# Patient Record
Sex: MALE
[De-identification: names, ages, dates, MRNs, and addresses within clinical notes are randomized per-mention and may not be internally consistent; named-entity substitution may affect disease eponyms.]

---

## 2020-01-24 PROBLEM — Z00.00 ENCOUNTER FOR PREVENTIVE HEALTH EXAMINATION: Status: ACTIVE | Noted: 2020-01-24

## 2020-01-28 ENCOUNTER — APPOINTMENT (OUTPATIENT)
Dept: UROLOGY | Facility: CLINIC | Age: 30
End: 2020-01-28
Payer: COMMERCIAL

## 2020-01-28 VITALS — HEART RATE: 52 BPM | TEMPERATURE: 98 F | DIASTOLIC BLOOD PRESSURE: 72 MMHG | SYSTOLIC BLOOD PRESSURE: 113 MMHG

## 2020-01-28 DIAGNOSIS — F17.200 NICOTINE DEPENDENCE, UNSPECIFIED, UNCOMPLICATED: ICD-10-CM

## 2020-01-28 PROCEDURE — 99204 OFFICE O/P NEW MOD 45 MIN: CPT

## 2020-01-28 RX ORDER — BUPRENORPHINE HYDROCHLORIDE, NALOXONE HYDROCHLORIDE 8; 2 MG/1; MG/1
8-2 FILM, SOLUBLE BUCCAL; SUBLINGUAL
Refills: 0 | Status: ACTIVE | COMMUNITY

## 2020-01-28 RX ORDER — PAPAVERINE HYDROCHLORIDE 30 MG/ML
30 INJECTION, SOLUTION INTRAVENOUS
Qty: 2 | Refills: 0 | Status: ACTIVE | COMMUNITY
Start: 2020-01-28 | End: 1900-01-01

## 2020-01-28 NOTE — ASSESSMENT
[FreeTextEntry1] : peyronies disease\par no palpable plaque noted\par exam not consistent with peyronies\par most likely ED but received xiaflex\par will set up for penile doppler in office\par

## 2020-01-28 NOTE — PHYSICAL EXAM
[General Appearance - Well Developed] : well developed [Well Groomed] : well groomed [General Appearance - Well Nourished] : well nourished [Normal Appearance] : normal appearance [] : no respiratory distress [Heart Rate And Rhythm] : Heart rate and rhythm were normal [Abdomen Tenderness] : non-tender [Abdomen Soft] : soft [Abdomen Hernia] : no hernia was discovered [Penis Abnormality] : normal circumcised penis [Costovertebral Angle Tenderness] : no ~M costovertebral angle tenderness [Urethral Meatus] : meatus normal [Urinary Bladder Findings] : the bladder was normal on palpation [Scrotum] : the scrotum was normal [Epididymis] : the epididymides were normal [Testes Tenderness] : no tenderness of the testes [Normal Station and Gait] : the gait and station were normal for the patient's age [FreeTextEntry1] : no plaque palpable [Testes Mass (___cm)] : there were no testicular masses [No Focal Deficits] : no focal deficits [Skin Color & Pigmentation] : normal skin color and pigmentation [Oriented To Time, Place, And Person] : oriented to person, place, and time [No Palpable Adenopathy] : no palpable adenopathy

## 2020-01-28 NOTE — HISTORY OF PRESENT ILLNESS
[FreeTextEntry1] : 29M hx substance abuse using occasional suboxone comes in with complaint of possible Peyronies diesase. given diagnosis by PCP. was complaining of mild ED symptoms. reports hard lump on the side of his penis. did not notice previously. quarter sized. non painful. only shows at time of erection. noted x 1year. previously 10/10 erections. now 7/10 erection. decline noted over the last year. patient not seeing any curvature. 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 received course of xiaflex x 4 injections. no lump prior to injections. was sexually active soon after injection. increasing bother. no family histoyr prostatekidney bladder cance.r

## 2020-02-06 ENCOUNTER — APPOINTMENT (OUTPATIENT)
Dept: UROLOGY | Facility: CLINIC | Age: 30
End: 2020-02-06

## 2020-03-06 ENCOUNTER — APPOINTMENT (OUTPATIENT)
Dept: UROLOGY | Facility: CLINIC | Age: 30
End: 2020-03-06
Payer: COMMERCIAL

## 2020-03-06 VITALS — TEMPERATURE: 98.4 F | SYSTOLIC BLOOD PRESSURE: 118 MMHG | DIASTOLIC BLOOD PRESSURE: 71 MMHG | HEART RATE: 48 BPM

## 2020-03-06 DIAGNOSIS — N48.6 INDURATION PENIS PLASTICA: ICD-10-CM

## 2020-03-06 PROCEDURE — 93980 PENILE VASCULAR STUDY: CPT

## 2020-03-06 PROCEDURE — 99213 OFFICE O/P EST LOW 20 MIN: CPT | Mod: 25

## 2020-03-06 PROCEDURE — 54235 NJX CORPORA CAVERNOSA RX AGT: CPT

## 2020-03-06 RX ORDER — SILDENAFIL 100 MG/1
100 TABLET, FILM COATED ORAL
Qty: 6 | Refills: 11 | Status: ACTIVE | COMMUNITY
Start: 2020-03-06 | End: 1900-01-01

## 2020-03-09 NOTE — HISTORY OF PRESENT ILLNESS
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

## 2020-03-09 NOTE — ASSESSMENT
[FreeTextEntry1] : penile defomity\par recommend sildenafil 100\par will refer for second opinion to confirm diagnosis\par discussed with him and his father

## 2021-04-29 ENCOUNTER — APPOINTMENT (OUTPATIENT)
Dept: UROLOGY | Facility: CLINIC | Age: 31
End: 2021-04-29

## 2024-09-17 ENCOUNTER — NON-APPOINTMENT (OUTPATIENT)
Age: 34
End: 2024-09-17

## 2024-09-23 ENCOUNTER — HOSPITAL ENCOUNTER (EMERGENCY)
Dept: HOSPITAL 74 - FER | Age: 34
Discharge: HOME | End: 2024-09-23
Payer: COMMERCIAL

## 2024-09-23 VITALS
SYSTOLIC BLOOD PRESSURE: 151 MMHG | DIASTOLIC BLOOD PRESSURE: 82 MMHG | TEMPERATURE: 98.1 F | HEART RATE: 67 BPM | RESPIRATION RATE: 16 BRPM

## 2024-09-23 VITALS — BODY MASS INDEX: 23.7 KG/M2

## 2024-09-23 DIAGNOSIS — M79.672: ICD-10-CM

## 2024-09-23 DIAGNOSIS — M79.671: Primary | ICD-10-CM

## 2024-09-23 RX ADMIN — ACETAMINOPHEN ONE: 500 TABLET, FILM COATED ORAL at 10:36
